# Patient Record
Sex: FEMALE | Race: WHITE | NOT HISPANIC OR LATINO | Employment: FULL TIME | URBAN - METROPOLITAN AREA
[De-identification: names, ages, dates, MRNs, and addresses within clinical notes are randomized per-mention and may not be internally consistent; named-entity substitution may affect disease eponyms.]

---

## 2017-01-04 ENCOUNTER — NON-PROVIDER VISIT (OUTPATIENT)
Dept: OCCUPATIONAL MEDICINE | Facility: CLINIC | Age: 32
End: 2017-01-04

## 2017-01-04 ENCOUNTER — APPOINTMENT (OUTPATIENT)
Dept: URGENT CARE | Facility: PHYSICIAN GROUP | Age: 32
End: 2017-01-04

## 2017-01-04 DIAGNOSIS — Z11.1 ENCOUNTER FOR PPD TEST: Primary | ICD-10-CM

## 2017-01-04 PROCEDURE — 86580 TB INTRADERMAL TEST: CPT | Performed by: PREVENTIVE MEDICINE

## 2017-01-06 ENCOUNTER — NON-PROVIDER VISIT (OUTPATIENT)
Dept: OCCUPATIONAL MEDICINE | Facility: CLINIC | Age: 32
End: 2017-01-06

## 2017-01-06 DIAGNOSIS — Z02.1 PHYSICAL EXAM, PRE-EMPLOYMENT: ICD-10-CM

## 2017-01-06 LAB — TB WHEAL 3D P 5 TU DIAM: NORMAL MM

## 2017-01-06 PROCEDURE — 99204 OFFICE O/P NEW MOD 45 MIN: CPT | Performed by: PREVENTIVE MEDICINE

## 2017-01-06 NOTE — MR AVS SNAPSHOT
Kaye Adam   2017 2:20 PM   Non-Provider Visit   MRN: [id number]    Department:  [facility]   Dept Phone:  [phone]    Description:  Female : 1985   Provider:  Charles Thornton D.O.           Allergies as of 2017     No Known Allergies      You were diagnosed with     Physical exam, pre-employment   [717826]         Vital Signs     Smoking Status                   Former Smoker           Basic Information     Date Of Birth Sex Race Ethnicity Preferred Language    1985 Female White Non- English      Health Maintenance        Date Due Completion Dates    IMM DTaP/Tdap/Td Vaccine (1 - Tdap) 10/2/2006 ---    PAP SMEAR 2014    IMM INFLUENZA (1) 2016 ---            Current Immunizations     Tuberculin Skin Test 2017  1:40 PM      Below and/or attached are the medications your provider expects you to take. Review all of your home medications and newly ordered medications with your provider and/or pharmacist. Follow medication instructions as directed by your provider and/or pharmacist. Please keep your medication list with you and share with your provider. Update the information when medications are discontinued, doses are changed, or new medications (including over-the-counter products) are added; and carry medication information at all times in the event of emergency situations     Allergies:  No Known Allergies          Medications  Valid as of: 2017 -  5:47 PM    Generic Name Brand Name Tablet Size Instructions for use    Cyclobenzaprine HCl (Tab) FLEXERIL 10 MG Take 1 Tab by mouth 3 times a day as needed.        Norgestimate-Eth Estradiol   Take  by mouth.        Ondansetron (TABLET DISPERSIBLE) ZOFRAN ODT 4 MG Take 1 Tab by mouth every 8 hours as needed for Nausea/Vomiting.        Prenatal Vit-Fe Fumarate-FA   Take  by mouth.        .                 Medicines prescribed today were sent to:     [facility]/PHARMACY #[id number] - [place]ASHIR -  [address]    [address] [place] [address]    Phone: [phone] Fax: [phone]    Open 24 Hours?: No      Medication refill instructions:       If your prescription bottle indicates you have medication refills left, it is not necessary to call your provider’s office. Please contact your pharmacy and they will refill your medication.    If your prescription bottle indicates you do not have any refills left, you may request refills at any time through one of the following ways: The online GrabInbox system (except Urgent Care), by calling your provider’s office, or by asking your pharmacy to contact your provider’s office with a refill request. Medication refills are processed only during regular business hours and may not be available until the next business day. Your provider may request additional information or to have a follow-up visit with you prior to refilling your medication.   *Please Note: Medication refills are assigned a new Rx number when refilled electronically. Your pharmacy may indicate that no refills were authorized even though a new prescription for the same medication is available at the pharmacy. Please request the medicine by name with the pharmacy before contacting your provider for a refill.           GrabInbox Access Code: [email or web address]  Expires: 1/29/2017  8:20 AM    Your email address is not on file at [facility].  Email Addresses are required for you to sign up for GrabInbox, please contact [phone] to verify your personal information and to provide your email address prior to attempting to register for GrabInbox.    Kaye Adam  [address] [place] [address]    GrabInbox  A secure, online tool to manage your health information     [facility]’s GrabInbox® is a secure, online tool that connects you to your personalized health information from the privacy of your home -- day or night - making it very easy for you to manage your healthcare. Once the  activation process is completed, you can even access your medical information using the [employer] chacho, which is available for free in the Apple Chacho store or Google Play store.     To learn more about [employer], visit [email or web address]    There are two levels of access available (as shown below):   My Chart Features  [facility]  Specialists [facility]  Urgent  Care Non-[facility] Doctor   Email your healthcare team securely and privately 24/7 X X X    Manage appointments: schedule your next appointment; view details of past/upcoming appointments X      Request prescription refills. X      View recent personal medical records, including lab and immunizations X X X X   View health record, including health history, allergies, medications X X X X   Read reports about your outpatient visits, procedures, consult and ER notes X X X X   See your discharge summary, which is a recap of your hospital and/or ER visit that includes your diagnosis, lab results, and care plan X X  X     How to register for [employer]:  Once your e-mail address has been verified, follow the following steps to sign up for [employer].     1. Go to  [email or web address]  2. Click on the Sign Up Now box, which takes you to the New Member Sign Up page. You will need to provide the following information:  a. Enter your [employer] Access Code exactly as it appears at the top of this page. (You will not need to use this code after you’ve completed the sign-up process. If you do not sign up before the expiration date, you must request a new code.)   b. Enter your date of birth.   c. Enter your home email address.   d. Click Submit, and follow the next screen’s instructions.  3. Create a Sipera Systemst ID. This will be your [employer] login ID and cannot be changed, so think of one that is secure and easy to remember.  4. Create a [employer] password. You can change your password at any time.  5. Enter your Password Reset Question and Answer.  This can be used at a later time if you forget your password.   6. Enter your e-mail address. This allows you to receive e-mail notifications when new information is available in Getourguide.  7. Click Sign Up. You can now view your health information.    For assistance activating your Getourguide account, call [phone]

## 2017-01-11 ENCOUNTER — NON-PROVIDER VISIT (OUTPATIENT)
Dept: OCCUPATIONAL MEDICINE | Facility: CLINIC | Age: 32
End: 2017-01-11

## 2017-01-11 DIAGNOSIS — Z11.1 ENCOUNTER FOR PPD TEST: ICD-10-CM

## 2017-01-11 PROCEDURE — 86580 TB INTRADERMAL TEST: CPT | Performed by: PREVENTIVE MEDICINE

## 2017-01-11 NOTE — MR AVS SNAPSHOT
Kaye Adam   2017 11:30 AM   Non-Provider Visit   MRN: [id number]    Department:  [facility]   Dept Phone:  [phone]    Description:  Female : 1985   Provider:  KATHY GORDON MA           Reason for Visit     PPD Placement           Allergies as of 2017     No Known Allergies      You were diagnosed with     Encounter for PPD test   [624490]         Vital Signs     Smoking Status                   Former Smoker           Basic Information     Date Of Birth Sex Race Ethnicity Preferred Language    1985 Female White Non- English      Health Maintenance        Date Due Completion Dates    IMM DTaP/Tdap/Td Vaccine (1 - Tdap) 10/2/2006 ---    PAP SMEAR 2014    IMM INFLUENZA (1) 2016 ---            Current Immunizations     Tuberculin Skin Test 2017, 2017  1:40 PM      Below and/or attached are the medications your provider expects you to take. Review all of your home medications and newly ordered medications with your provider and/or pharmacist. Follow medication instructions as directed by your provider and/or pharmacist. Please keep your medication list with you and share with your provider. Update the information when medications are discontinued, doses are changed, or new medications (including over-the-counter products) are added; and carry medication information at all times in the event of emergency situations     Allergies:  No Known Allergies          Medications  Valid as of: 2017 - 12:42 PM    Generic Name Brand Name Tablet Size Instructions for use    Cyclobenzaprine HCl (Tab) FLEXERIL 10 MG Take 1 Tab by mouth 3 times a day as needed.        Norgestimate-Eth Estradiol   Take  by mouth.        Ondansetron (TABLET DISPERSIBLE) ZOFRAN ODT 4 MG Take 1 Tab by mouth every 8 hours as needed for Nausea/Vomiting.        Prenatal Vit-Fe Fumarate-FA   Take  by mouth.        .                 Medicines prescribed today  were sent to:     [facility]/PHARMACY #[id number] - [place], [place] - [address]    [address] [address]    Phone: [phone] Fax: [phone]    Open 24 Hours?: No      Medication refill instructions:       If your prescription bottle indicates you have medication refills left, it is not necessary to call your provider’s office. Please contact your pharmacy and they will refill your medication.    If your prescription bottle indicates you do not have any refills left, you may request refills at any time through one of the following ways: The online Twiigg system (except Urgent Care), by calling your provider’s office, or by asking your pharmacy to contact your provider’s office with a refill request. Medication refills are processed only during regular business hours and may not be available until the next business day. Your provider may request additional information or to have a follow-up visit with you prior to refilling your medication.   *Please Note: Medication refills are assigned a new Rx number when refilled electronically. Your pharmacy may indicate that no refills were authorized even though a new prescription for the same medication is available at the pharmacy. Please request the medicine by name with the pharmacy before contacting your provider for a refill.           Twiigg Access Code: [email or web address]  Expires: 1/29/2017  8:20 AM    Your email address is not on file at [facility].  Email Addresses are required for you to sign up for Twiigg, please contact [phone] to verify your personal information and to provide your email address prior to attempting to register for Twiigg.    Kaye Adam  [address] [place] [address]    Twiigg  A secure, online tool to manage your health information     [facility]’s Twiigg® is a secure, online tool that connects you to your personalized health information from the privacy of your home -- day or night - making it very easy for  you to manage your healthcare. Once the activation process is completed, you can even access your medical information using the GoPlaceIt chacho, which is available for free in the Apple Chacho store or Google Play store.     To learn more about GoPlaceIt, visit [email or web address]    There are two levels of access available (as shown below):   My Chart Features  [facility]  Specialists [facility]  Urgent  Care Non-[facility]   Email your healthcare team securely and privately 24/7 X X X    Manage appointments: schedule your next appointment; view details of past/upcoming appointments X      Request prescription refills. X      View recent personal medical records, including lab and immunizations X X X X   View health record, including health history, allergies, medications X X X X   Read reports about your outpatient visits, procedures, consult and ER notes X X X X   See your discharge summary, which is a recap of your hospital and/or ER visit that includes your diagnosis, lab results, and care plan X X  X     How to register for GoPlaceIt:  Once your e-mail address has been verified, follow the following steps to sign up for GoPlaceIt.     1. Go to  [email or web address]  2. Click on the Sign Up Now box, which takes you to the New Member Sign Up page. You will need to provide the following information:  a. Enter your GoPlaceIt Access Code exactly as it appears at the top of this page. (You will not need to use this code after you’ve completed the sign-up process. If you do not sign up before the expiration date, you must request a new code.)   b. Enter your date of birth.   c. Enter your home email address.   d. Click Submit, and follow the next screen’s instructions.  3. Create a Design LED Productst ID. This will be your GoPlaceIt login ID and cannot be changed, so think of one that is secure and easy to remember.  4. Create a GoPlaceIt password. You can change your password at any time.  5. Enter your  Password Reset Question and Answer. This can be used at a later time if you forget your password.   6. Enter your e-mail address. This allows you to receive e-mail notifications when new information is available in LangoLab.  7. Click Sign Up. You can now view your health information.    For assistance activating your LangoLab account, call [phone]

## 2017-01-13 ENCOUNTER — NON-PROVIDER VISIT (OUTPATIENT)
Dept: OCCUPATIONAL MEDICINE | Facility: CLINIC | Age: 32
End: 2017-01-13

## 2017-01-13 LAB — TB WHEAL 3D P 5 TU DIAM: NORMAL MM

## 2017-01-30 ENCOUNTER — NON-PROVIDER VISIT (OUTPATIENT)
Dept: URGENT CARE | Facility: CLINIC | Age: 32
End: 2017-01-30

## 2017-01-30 DIAGNOSIS — Z02.1 PRE-EMPLOYMENT DRUG SCREENING: ICD-10-CM

## 2017-01-30 LAB
AMP AMPHETAMINE: NORMAL
COC COCAINE: NORMAL
INT CON NEG: NORMAL
INT CON POS: NORMAL
MET METHAMPHETAMINES: NORMAL
OPI OPIATES: NORMAL
PCP PHENCYCLIDINE: NORMAL
POC DRUG COMMENT 753798-OCCUPATIONAL HEALTH: NORMAL
THC: NORMAL

## 2017-01-30 PROCEDURE — 80305 DRUG TEST PRSMV DIR OPT OBS: CPT | Performed by: NURSE PRACTITIONER

## 2017-02-14 ENCOUNTER — APPOINTMENT (OUTPATIENT)
Dept: RADIOLOGY | Facility: MEDICAL CENTER | Age: 32
End: 2017-02-14
Attending: EMERGENCY MEDICINE

## 2017-02-14 ENCOUNTER — APPOINTMENT (OUTPATIENT)
Dept: RADIOLOGY | Facility: MEDICAL CENTER | Age: 32
End: 2017-02-14

## 2017-02-14 ENCOUNTER — HOSPITAL ENCOUNTER (EMERGENCY)
Facility: MEDICAL CENTER | Age: 32
End: 2017-02-14
Attending: EMERGENCY MEDICINE

## 2017-02-14 VITALS
TEMPERATURE: 101.5 F | DIASTOLIC BLOOD PRESSURE: 71 MMHG | HEART RATE: 104 BPM | BODY MASS INDEX: 17.64 KG/M2 | WEIGHT: 109.79 LBS | OXYGEN SATURATION: 95 % | RESPIRATION RATE: 24 BRPM | SYSTOLIC BLOOD PRESSURE: 126 MMHG | HEIGHT: 66 IN

## 2017-02-14 DIAGNOSIS — B34.9 VIRAL ILLNESS: ICD-10-CM

## 2017-02-14 LAB
ALBUMIN SERPL BCP-MCNC: 3.9 G/DL (ref 3.2–4.9)
ALBUMIN/GLOB SERPL: 1.4 G/DL
ALP SERPL-CCNC: 42 U/L (ref 30–99)
ALT SERPL-CCNC: 10 U/L (ref 2–50)
AMPHET UR QL SCN: NEGATIVE
ANION GAP SERPL CALC-SCNC: 11 MMOL/L (ref 0–11.9)
APPEARANCE UR: ABNORMAL
AST SERPL-CCNC: 13 U/L (ref 12–45)
BACTERIA #/AREA URNS HPF: ABNORMAL /HPF
BARBITURATES UR QL SCN: NEGATIVE
BASOPHILS # BLD AUTO: 0.4 % (ref 0–1.8)
BASOPHILS # BLD: 0.03 K/UL (ref 0–0.12)
BENZODIAZ UR QL SCN: NEGATIVE
BILIRUB SERPL-MCNC: 0.6 MG/DL (ref 0.1–1.5)
BILIRUB UR QL STRIP.AUTO: NEGATIVE
BUN SERPL-MCNC: 7 MG/DL (ref 8–22)
BZE UR QL SCN: NEGATIVE
CALCIUM SERPL-MCNC: 8.8 MG/DL (ref 8.5–10.5)
CANNABINOIDS UR QL SCN: NEGATIVE
CHLORIDE SERPL-SCNC: 108 MMOL/L (ref 96–112)
CO2 SERPL-SCNC: 18 MMOL/L (ref 20–33)
COLOR UR: COLORLESS
CREAT SERPL-MCNC: 0.65 MG/DL (ref 0.5–1.4)
EOSINOPHIL # BLD AUTO: 0 K/UL (ref 0–0.51)
EOSINOPHIL NFR BLD: 0 % (ref 0–6.9)
EPI CELLS #/AREA URNS HPF: ABNORMAL /HPF
ERYTHROCYTE [DISTWIDTH] IN BLOOD BY AUTOMATED COUNT: 46 FL (ref 35.9–50)
FLUAV+FLUBV AG SPEC QL IA: NORMAL
GFR SERPL CREATININE-BSD FRML MDRD: >60 ML/MIN/1.73 M 2
GLOBULIN SER CALC-MCNC: 2.7 G/DL (ref 1.9–3.5)
GLUCOSE SERPL-MCNC: 100 MG/DL (ref 65–99)
GLUCOSE UR STRIP.AUTO-MCNC: NEGATIVE MG/DL
HCG SERPL QL: NEGATIVE
HCG UR QL: NEGATIVE
HCT VFR BLD AUTO: 37 % (ref 37–47)
HGB BLD-MCNC: 12.4 G/DL (ref 12–16)
IMM GRANULOCYTES # BLD AUTO: 0.02 K/UL (ref 0–0.11)
IMM GRANULOCYTES NFR BLD AUTO: 0.3 % (ref 0–0.9)
KETONES UR STRIP.AUTO-MCNC: ABNORMAL MG/DL
LACTATE BLD-SCNC: 1.6 MMOL/L (ref 0.5–2)
LEUKOCYTE ESTERASE UR QL STRIP.AUTO: ABNORMAL
LYMPHOCYTES # BLD AUTO: 0.59 K/UL (ref 1–4.8)
LYMPHOCYTES NFR BLD: 7.9 % (ref 22–41)
MCH RBC QN AUTO: 32.8 PG (ref 27–33)
MCHC RBC AUTO-ENTMCNC: 33.5 G/DL (ref 33.6–35)
MCV RBC AUTO: 97.9 FL (ref 81.4–97.8)
MDMA UR QL SCN: NEGATIVE
METHADONE UR QL SCN: NEGATIVE
MICRO URNS: ABNORMAL
MONOCYTES # BLD AUTO: 0.52 K/UL (ref 0–0.85)
MONOCYTES NFR BLD AUTO: 7 % (ref 0–13.4)
NEUTROPHILS # BLD AUTO: 6.3 K/UL (ref 2–7.15)
NEUTROPHILS NFR BLD: 84.4 % (ref 44–72)
NITRITE UR QL STRIP.AUTO: POSITIVE
NRBC # BLD AUTO: 0 K/UL
NRBC BLD AUTO-RTO: 0 /100 WBC
OPIATES UR QL SCN: NEGATIVE
OXYCODONE UR QL SCN: NEGATIVE
PCP UR QL SCN: NEGATIVE
PH UR STRIP.AUTO: 7 [PH]
PLATELET # BLD AUTO: 156 K/UL (ref 164–446)
PMV BLD AUTO: 10.4 FL (ref 9–12.9)
POTASSIUM SERPL-SCNC: 3.5 MMOL/L (ref 3.6–5.5)
PROPOXYPH UR QL SCN: NEGATIVE
PROT SERPL-MCNC: 6.6 G/DL (ref 6–8.2)
PROT UR QL STRIP: NEGATIVE MG/DL
RBC # BLD AUTO: 3.78 M/UL (ref 4.2–5.4)
RBC # URNS HPF: ABNORMAL /HPF
RBC UR QL AUTO: ABNORMAL
SIGNIFICANT IND 70042: NORMAL
SITE SITE: NORMAL
SODIUM SERPL-SCNC: 137 MMOL/L (ref 135–145)
SOURCE SOURCE: NORMAL
SP GR UR REFRACTOMETRY: 1.01
SP GR UR STRIP.AUTO: 1.01
WBC # BLD AUTO: 7.5 K/UL (ref 4.8–10.8)
WBC #/AREA URNS HPF: >150 /HPF

## 2017-02-14 PROCEDURE — 80307 DRUG TEST PRSMV CHEM ANLYZR: CPT

## 2017-02-14 PROCEDURE — 700105 HCHG RX REV CODE 258: Performed by: EMERGENCY MEDICINE

## 2017-02-14 PROCEDURE — 81001 URINALYSIS AUTO W/SCOPE: CPT

## 2017-02-14 PROCEDURE — 84703 CHORIONIC GONADOTROPIN ASSAY: CPT

## 2017-02-14 PROCEDURE — 96361 HYDRATE IV INFUSION ADD-ON: CPT

## 2017-02-14 PROCEDURE — 96360 HYDRATION IV INFUSION INIT: CPT

## 2017-02-14 PROCEDURE — A9270 NON-COVERED ITEM OR SERVICE: HCPCS | Performed by: EMERGENCY MEDICINE

## 2017-02-14 PROCEDURE — 80053 COMPREHEN METABOLIC PANEL: CPT

## 2017-02-14 PROCEDURE — 700102 HCHG RX REV CODE 250 W/ 637 OVERRIDE(OP): Performed by: EMERGENCY MEDICINE

## 2017-02-14 PROCEDURE — 87400 INFLUENZA A/B EACH AG IA: CPT

## 2017-02-14 PROCEDURE — 87086 URINE CULTURE/COLONY COUNT: CPT

## 2017-02-14 PROCEDURE — 85025 COMPLETE CBC W/AUTO DIFF WBC: CPT

## 2017-02-14 PROCEDURE — 71010 DX-CHEST-PORTABLE (1 VIEW): CPT

## 2017-02-14 PROCEDURE — 87077 CULTURE AEROBIC IDENTIFY: CPT

## 2017-02-14 PROCEDURE — 99284 EMERGENCY DEPT VISIT MOD MDM: CPT

## 2017-02-14 PROCEDURE — 87040 BLOOD CULTURE FOR BACTERIA: CPT | Mod: 91

## 2017-02-14 PROCEDURE — 83605 ASSAY OF LACTIC ACID: CPT

## 2017-02-14 PROCEDURE — 81025 URINE PREGNANCY TEST: CPT

## 2017-02-14 PROCEDURE — 87186 SC STD MICRODIL/AGAR DIL: CPT

## 2017-02-14 RX ORDER — ACETAMINOPHEN 325 MG/1
650 TABLET ORAL ONCE
Status: COMPLETED | OUTPATIENT
Start: 2017-02-14 | End: 2017-02-14

## 2017-02-14 RX ORDER — SODIUM CHLORIDE 9 MG/ML
2000 INJECTION, SOLUTION INTRAVENOUS ONCE
Status: COMPLETED | OUTPATIENT
Start: 2017-02-14 | End: 2017-02-14

## 2017-02-14 RX ORDER — IBUPROFEN 800 MG/1
600 TABLET ORAL EVERY 8 HOURS PRN
Qty: 30 TAB | Refills: 0 | Status: SHIPPED | OUTPATIENT
Start: 2017-02-14

## 2017-02-14 RX ORDER — NITROFURANTOIN 25; 75 MG/1; MG/1
100 CAPSULE ORAL ONCE
Status: COMPLETED | OUTPATIENT
Start: 2017-02-14 | End: 2017-02-14

## 2017-02-14 RX ORDER — IBUPROFEN 600 MG/1
600 TABLET ORAL ONCE
Status: COMPLETED | OUTPATIENT
Start: 2017-02-14 | End: 2017-02-14

## 2017-02-14 RX ORDER — ACETAMINOPHEN 500 MG
500 TABLET ORAL EVERY 6 HOURS PRN
Qty: 30 TAB | Refills: 0 | Status: SHIPPED | OUTPATIENT
Start: 2017-02-14

## 2017-02-14 RX ADMIN — SODIUM CHLORIDE 2000 ML: 9 INJECTION, SOLUTION INTRAVENOUS at 20:51

## 2017-02-14 RX ADMIN — NITROFURANTOIN MONOHYDRATE AND NITROFURANTOIN MACROCRYSTALLINE 100 MG: 75; 25 CAPSULE ORAL at 21:39

## 2017-02-14 RX ADMIN — ACETAMINOPHEN 650 MG: 325 TABLET, FILM COATED ORAL at 21:39

## 2017-02-14 RX ADMIN — IBUPROFEN 600 MG: 600 TABLET, FILM COATED ORAL at 20:50

## 2017-02-14 ASSESSMENT — PAIN SCALES - GENERAL: PAINLEVEL_OUTOF10: 10

## 2017-02-14 NOTE — ED AVS SNAPSHOT
2/14/2017          Kaye Adam  [address]  [place] [address]    Dear Kaye:    [facility] wants to ensure your discharge home is safe and you or your loved ones have had all your questions answered regarding your care after you leave the hospital.    You may receive a telephone call within two days of your discharge.  This call is to make certain you understand your discharge instructions as well as ensure we provided you with the best care possible during your stay with us.     The call will only last approximately 3-5 minutes and will be done by a nurse.    Once again, we want to ensure your discharge home is safe and that you have a clear understanding of any next steps in your care.  If you have any questions or concerns, please do not hesitate to contact us, we are here for you.  Thank you for choosing [facility] for your healthcare needs.    Sincerely,    Yinka Davis    [facility]

## 2017-02-14 NOTE — ED AVS SNAPSHOT
[facility] Access Code: [email or web address]  Expires: 2/27/2017  9:48 AM    Your email address is not on file at [facility].  Email Addresses are required for you to sign up for [facility], please contact [phone] to verify your personal information and to provide your email address prior to attempting to register for [facility].    Kaye Adam  [address] [place] [address], [place] [address]    [facility]  A secure, online tool to manage your health information     [facility]’s [facility]® is a secure, online tool that connects you to your personalized health information from the privacy of your home -- day or night - making it very easy for you to manage your healthcare. Once the activation process is completed, you can even access your medical information using the [facility] chacho, which is available for free in the Apple Chacho store or Google Play store.     To learn more about [facility], visit [email or web address]    There are two levels of access available (as shown below):   My Chart Features  [facility] Primary Care Doctor [facility]  Specialists [facility]  Urgent  Care Non-[facility] Primary Care Doctor   Email your healthcare team securely and privately 24/7 X X X    Manage appointments: schedule your next appointment; view details of past/upcoming appointments X      Request prescription refills. X      View recent personal medical records, including lab and immunizations X X X X   View health record, including health history, allergies, medications X X X X   Read reports about your outpatient visits, procedures, consult and ER notes X X X X   See your discharge summary, which is a recap of your hospital and/or ER visit that includes your diagnosis, lab results, and care plan X X  X     How to register for YouHelpt:  Once your e-mail address has been verified, follow the following steps to sign up for YouHelpt.     1. Go to  [email or web address]  2. Click on the Sign Up Now box, which takes you to the New Member Sign Up page. You  will need to provide the following information:  a. Enter your WorkFlex Solutions Access Code exactly as it appears at the top of this page. (You will not need to use this code after you’ve completed the sign-up process. If you do not sign up before the expiration date, you must request a new code.)   b. Enter your date of birth.   c. Enter your home email address.   d. Click Submit, and follow the next screen’s instructions.  3. Create a Taskmitt ID. This will be your WorkFlex Solutions login ID and cannot be changed, so think of one that is secure and easy to remember.  4. Create a Work[employer] Solutions password. You can change your password at any time.  5. Enter your Password Reset Question and Answer. This can be used at a later time if you forget your password.   6. Enter your e-mail address. This allows you to receive e-mail notifications when new information is available in WorkFlex Solutions.  7. Click Sign Up. You can now view your health information.    For assistance activating your WorkFlex Solutions account, call [phone]

## 2017-02-14 NOTE — LETTER
2/16/2017               Kaye Adam  [address]  [place] [address]        Dear Kaye (MR#[id number])    As we have been unable to contact you by phone, this letter is sent in regards to your recent visit to the [facility] Emergency Department on 2/14/2017.  During the visit, tests were performed to assist the physician in a medical diagnosis.  A review of those tests requires that we notify you of the following:    Your urine culture and sensitivity that was POSITIVE for a bacteria called Escherichia coli, and further treatment may be necessary. IF YOU ARE NOT FEELING BETTER PLEASE CONTACT ME AS SOON AS POSSIBLE AT THE NUMBER BELOW.       Because of the above findings, we advise that you see your private physician or you may return to the Emergency Department for additional treatment.      Please feel free to contact me at the number below if you have any questions or concerns. Thank you for your cooperation in the matter.    Sincerely,  ED Culture Follow-Up Staff  Zee James, PharmD    [facility], Emergency Department  [address] [place] [address]  [phone] (ED Culture Line)  [phone]

## 2017-02-14 NOTE — ED AVS SNAPSHOT
Home Care Instructions                                                                                                                Kaye Adam   MRN: [id number]    Department:  [facility], Emergency Dept   Date of Visit:  2/14/2017            [facility], Emergency Dept    [address] [facility] [address]    Phone:  [phone]      You were seen by     Saurav Street M.D.      Your Diagnosis Was     Viral illness     B34.9       These are the medications you received during your hospitalization from 02/14/2017 1859 to 02/14/2017 2224     Date/Time Order Dose Route Action    02/14/2017 2050 ibuprofen (MOTRIN) tablet 600 mg 600 mg Oral Given    02/14/2017 2051 NS infusion 2,000 mL 2,000 mL Intravenous New Bag    02/14/2017 2139 acetaminophen (TYLENOL) tablet 650 mg 650 mg Oral Given    02/14/2017 2139 nitrofurantoin monohydr macro (MACROBID) capsule CAPS 100 mg 100 mg Oral Given      Follow-up Information     1. Follow up with [facility].    Contact information    [address] [address]  [phone]        2. Follow up with [facility].    Contact information    [address]  [facility] [address] [phone]          3. Follow up with [facility].    Contact information    [address]  [facility]  [phone]        Medication Information     Review all of your home medications and newly ordered medications with your primary doctor and/or pharmacist as soon as possible. Follow medication instructions as directed by your doctor and/or pharmacist.     Please keep your complete medication list with you and share with your physician. Update the information when medications are discontinued, doses are changed, or new medications (including over-the-counter products) are added; and carry medication information at all times in the event of emergency situations.               Medication List      START taking these  "medications        Instructions    acetaminophen 500 MG Tabs   Commonly known as:  TYLENOL    Take 1 Tab by mouth every 6 hours as needed.   Dose:  500 mg       ibuprofen 800 MG Tabs   Commonly known as:  MOTRIN    Take 1 Tab by mouth every 8 hours as needed.   Dose:  600 mg         ASK your doctor about these medications        Instructions    cyclobenzaprine 10 MG Tabs   Commonly known as:  FLEXERIL    Take 1 Tab by mouth 3 times a day as needed.   Dose:  10 mg       ondansetron 4 MG Tbdp   Commonly known as:  ZOFRAN ODT    Take 1 Tab by mouth every 8 hours as needed for Nausea/Vomiting.   Dose:  4 mg       PRENATAL FORMULA PO    Take  by mouth.       SPRINTEC 28 PO    Take  by mouth.               Procedures and tests performed during your visit     Procedure/Test Number of Times Performed    BETA-HCG QUALITATIVE SERUM 1    BETA-HCG QUALITATIVE URINE 1    BLOOD CULTURE 2    CARDIAC MONITORING 1    CBC WITH DIFFERENTIAL 1    COMP METABOLIC PANEL 1    DX-CHEST-PORTABLE (1 VIEW) 1    ESTIMATED GFR 1    INFLUENZA RAPID 1    IV Saline Lock 1    Lactic acid (lactate) 1    OXYGEN THERAPY PER PROTOCOL 1    REFRACTOMETER SG 1    URINALYSIS 1    URINE CULTURE(NEW) 1    URINE DRUG SCREEN 1    URINE MICROSCOPIC (W/UA) 1        Discharge Instructions       Your tests here were unremarkable. There is no sign of any bacterial infection. The most likely cause of your symptoms is a viral illness. The immune system is built to clear this type of infection. Antibiotics will not change the course of this type of infection. Alternate between tylenol and motrin as needed for aches and pains and fevers.   Viral illnesses can last 7-14 days and occasionally longer. Close observation of the patient, and returning to a doctor for severe symptoms remain important.      Viral Syndrome  You or your child has Viral Syndrome. It is the most common infection causing \"colds\" and infections in the nose, throat, sinuses, and breathing tubes. " Sometimes the infection causes nausea, vomiting, or diarrhea. The germ that causes the infection is a virus. No antibiotic or other medicine will kill it. There are medicines that you can take to make you or your child more comfortable.   HOME CARE INSTRUCTIONS   · Rest in bed until you start to feel better.   · If you have diarrhea or vomiting, eat small amounts of crackers and toast. Soup is helpful.   · Do not give aspirin or medicine that contains aspirin to children.   · Only take over-the-counter or prescription medicines for pain, discomfort, or fever as directed by your caregiver.   SEEK IMMEDIATE MEDICAL CARE IF:   · You or your child has not improved within one week.   · You or your child has pain that is not at least partially relieved by over-the-counter medicine.   · Thick, colored mucus or blood is coughed up.   · Discharge from the nose becomes thick yellow or green.   · Diarrhea or vomiting gets worse.   · There is any major change in your or your child's condition.   · You or your child develops a skin rash, stiff neck, severe headache, or are unable to hold down food or fluid.   · You or your child has an oral temperature above 102° F (38.9° C), not controlled by medicine.   · Your baby is older than 3 months with a rectal temperature of 102° F (38.9° C) or higher.   · Your baby is 3 months old or younger with a rectal temperature of 100.4° F (38° C) or higher.   Document Released: 12/03/2007 Document Revised: 03/11/2013 Document Reviewed: 12/03/2008  [employer]® Patient Information ©2013 [employer].          Patient Information     Patient Information    Following emergency treatment: all patient requiring follow-up care must return either to a private physician or a clinic if your condition worsens before you are able to obtain further medical attention, please return to the emergency room.     Billing Information    At [facility], we work to make the billing process streamlined for our  patients.  Our Representatives are here to answer any questions you may have regarding your hospital bill.  If you have insurance coverage and have supplied your insurance information to us, we will submit a claim to your insurer on your behalf.  Should you have any questions regarding your bill, we can be reached online or by phone as follows:  Online: You are able pay your bills online or live chat with our representatives about any billing questions you may have. We are here to help Monday - Friday from 8:00am to 7:30pm and 9:00am - 12:00pm on Saturdays.  Please visit [email or web address]  for more information.   Phone:  [phone] or [phone]    Please note that your emergency physician, surgeon, pathologist, radiologist, anesthesiologist, and other specialists are not employed by [facility] and will therefore bill separately for their services.  Please contact them directly for any questions concerning their bills at the numbers below:     Emergency Physician Services:  [phone]  [facility]:  [phone]  [facility]:  [phone]  [facility]:  [phone]    1. Your final bill may vary from the amount quoted upon discharge if all procedures are not complete at that time, or if your doctor has additional procedures of which we are not aware. You will receive an additional bill if you return to the Emergency Department at [facility] hospital for suture removal regardless of the facility of which the sutures were placed.     2. Please arrange for settlement of this account at the emergency registration.    3. All self-pay accounts are due in full at the time of treatment.  If you are unable to meet this obligation then payment is expected within 4-5 days.     4. If you have had radiology studies (CT, X-ray, Ultrasound, MRI), you have received a preliminary result during your emergency department visit. Please contact the  radiology department [phone] to receive a copy of your final result. Please discuss the Final result with your primary physician or with the follow up physician provided.     Crisis Hotline:  [place] Crisis Hotline:  9-898-GGRBWIF or 1-492.752.8845  [place] Crisis Hotline:    1-424.667.5818 or 065-425-2998         ED Discharge Follow Up Questions    1. In order to provide you with very good care, we would like to follow up with a phone call in the next few days.  May we have your permission to contact you?     YES /  NO    2. What is the best phone number to call you? (       )_____-__________    3. What is the best time to call you?      Morning  /  Afternoon  /  Evening                   Patient Signature:  ____________________________________________________________    Date:  ____________________________________________________________

## 2017-02-15 NOTE — ED NOTES
Pt updated on discharge orders. IVF still infusing. Pt calling  back to hospital for ride home. Will DC when IVF done infusing.

## 2017-02-15 NOTE — ED NOTES
Received call from , specific gravity 1.006, which can cause a false negative in pregnancy, suggested a serum qual/quant be drawn, discussed with Dr. Street, will draw additional lab.

## 2017-02-15 NOTE — ED PROVIDER NOTES
ED Provider Note    Scribed for Saurav Street M.D. by Mali Hector. 2/14/2017,  8:31 PM.    CHIEF COMPLAINT  Chief Complaint   Patient presents with   • Flu Like Symptoms     fever, generalised body aches. cough. x 2 days     HPI  Kaye Adam is a 31 y.o. female who presents to the Emergency Department with persistent flu-like symptoms onset two days ago.  She denies sick contacts.  The patient's main complaints are fevers and generalized body aches.  She has also developed mild cough and associated abdominal pain.  The patient does not have congestion, rhinorrhea, or sputum production.  She found minimal relief from over the counter medications and has not taken any doses in nine hours.  Currently, the patient has continued muscle soreness throughout her body.  She denies cough and breath shortness. The patient has not been suffering from recent dysuria or hematuria.  She does not note nausea or vomiting.  The patient is otherwise healthy without chronic medical conditions.  Her menstrual cycle ended one day ago. She did not receive a flu vaccination this year.  The patient denies additional symptoms or further pertinent medical history.     REVIEW OF SYSTEMS  See HPI for further details. All other systems are negative. C.     PAST MEDICAL HISTORY   has a past medical history of Backpain (pregnancy induced ) and Urinary tract infection, site not specified.    SOCIAL HISTORY  Social History     Social History Main Topics   • Smoking status: Former Smoker     Types: Cigarettes   • Smokeless tobacco:       Comment: QUIT 7 MONTHS AGO, USED TO SMOKE SOCIALLY.    • Alcohol Use: No      Comment: SPECICAL OCCATIONS   • Drug Use: No   • Sexual Activity:     Partners: Male     Birth Control/ Protection: Pill     History   Drug Use No     SURGICAL HISTORY  patient denies any surgical history    CURRENT MEDICATIONS  Home Medications     Reviewed by Delores Bravo R.N. (Registered Nurse) on 02/14/17  "at 2029  Med List Status: Complete    Medication Last Dose Status    cyclobenzaprine (FLEXERIL) 10 MG Tab not taking Active    Norgestimate-Eth Estradiol (SPRINTEC 28 PO) 2/14/2017 Active    ondansetron (ZOFRAN ODT) 4 MG TABLET DISPERSIBLE not taking Active    Prenatal Multivit-Min-Fe-FA (PRENATAL FORMULA PO) Not Taking Active              ALLERGIES  No Known Allergies    PHYSICAL EXAM  VITAL SIGNS: /71 mmHg  Pulse 118  Temp(Src) 38.6 °C (101.5 °F)  Resp 20  Ht 1.676 m (5' 6\")  Wt 49.8 kg (109 lb 12.6 oz)  BMI 17.73 kg/m2  SpO2 97%  Pulse ox interpretation: I interpret this pulse ox as normal.  Constitutional: Alert, Cooperative, Tearful  HENT: No signs of trauma, Bilateral external ears normal, Nose normal. Moist mucous membranes.   Eyes: Pupils are equal and reactive, Conjunctiva normal, Non-icteric.   Neck: Normal range of motion, No tenderness, Supple, No stridor.   Lymphatic: No lymphadenopathy noted.   Cardiovascular: Tachycardic. Regular rhythm, no murmurs.   Thorax & Lungs: Normal breath sounds, No respiratory distress, No wheezing, No chest tenderness.   Abdomen: Bowel sounds normal, Soft, No tenderness, No masses, No pulsatile masses. No peritoneal signs.  Skin: Warm, Dry, No erythema, No rash.   Back: No midline bony tenderness.   Extremities: Intact distal pulses, No edema, No tenderness, No cyanosis.  Musculoskeletal: Good range of motion in all major joints. No tenderness to palpation or major deformities noted.   Neurologic: Alert , Normal motor function, Normal sensory function, No focal deficits noted.   Psychiatric: Anxious, Tearful, Dramatic.     DIAGNOSTIC STUDIES / PROCEDURES    LABS  Labs Reviewed   CBC WITH DIFFERENTIAL - Abnormal; Notable for the following:     RBC 3.78 (*)     MCV 97.9 (*)     MCHC 33.5 (*)     Platelet Count 156 (*)     Neutrophils-Polys 84.40 (*)     Lymphocytes 7.90 (*)     Lymphs (Absolute) 0.59 (*)     All other components within normal limits   COMP " "METABOLIC PANEL - Abnormal; Notable for the following:     Potassium 3.5 (*)     Co2 18 (*)     Glucose 100 (*)     Bun 7 (*)     All other components within normal limits   URINALYSIS - Abnormal; Notable for the following:     Character Sl Cloudy (*)     Ketones Trace (*)     Nitrite Positive (*)     Leukocyte Esterase Large (*)     Occult Blood Small (*)     All other components within normal limits    Narrative:     Indication for culture:->Emergency Room Patient   URINE CULTURE(NEW) - Abnormal; Notable for the following:     Significant Indicator POS (*)     Urine Culture   (*)     Urine Culture   (*)     Value: Escherichia coli  >100,000 cfu/mL      All other components within normal limits    Narrative:     Indication for culture:->Emergency Room Patient   URINE MICROSCOPIC (W/UA) - Abnormal; Notable for the following:     WBC >150 (*)     RBC 2-5 (*)     Bacteria Few (*)     Epithelial Cells Many (*)     All other components within normal limits    Narrative:     Indication for culture:->Emergency Room Patient   LACTIC ACID   BLOOD CULTURE    Narrative:     Per Hospital Policy: Only change Specimen Src: to \"Line\" if  specified by physician order.   BLOOD CULTURE    Narrative:     Per Hospital Policy: Only change Specimen Src: to \"Line\" if  specified by physician order.   URINE DRUG SCREEN   HCG QUALITATIVE UR   REFRACTOMETER SG   ESTIMATED GFR   INFLUENZA RAPID   HCG QUAL SERUM     All labs reviewed by me.    RADIOLOGY  DX-CHEST-PORTABLE (1 VIEW)   Final Result         1. No acute cardiopulmonary abnormalities are identified.        The radiologist's interpretation of all radiological studies have been reviewed by me.    COURSE & MEDICAL DECISION MAKING  Nursing notes, VS, PMSFHx reviewed in chart.     Review of past medical records shows the patient has not been seen at the ED since 2009.      8:31 PM Patient seen and examined at bedside.  The patient was informed that her chest x-ray, ordered from triage, " was negative and she therefore does not present with pneumonia.  Ordered for UA, Urine Drug Screen, Beta-HCG Qualitative Urine, CBC, CMP,  Blood Culture, and Refractometer to evaluate. Patient will be treated with Tylenol, 650 mg tablet, Motrin, 600 mg tablet and an NS Infusion 2,000 ml for her symptoms.     9:06 PM- I was informed by the patient's attending nurse that the patient had a specific gravity of 1.006, which can cause a false negative pregnancy test. The patient will complete a Beta-HCG Qualitative Serum.     9:22 PM- The patient's ua looks like a contaminated sample, and she did not have urinary tract symptoms.     9:45 PM- At this time the patient's ED work up is complete.  I extensively reviewed the lab results, images, and radiologist's interpretations, see above.  The patient will be updated shortly.     9:50 PM- Re-examined; The patient is resting in bed with improved comfort. I discussed her above findings and plans for discharge with prescriptions for Tylenol and Motrin for a likely viral syndrome.  She understands that she likely presents with a viral illness for which antibiotics are not indicated. WE discussed supportive care measures. The patient was encouraged to take Ibuprofen and Motrin as needed for fever and pain. Patient understands that she can alternate these two medications as fever persists.  She was instructed to return to the ED if her symptoms worsen.  Patient understands that symptoms can persist for one to a few weeks. Patient was referred to [facility] and the [facility]. The patient will receive further information  to take home.  All questions and concerns were addressed.   Patient understands and agrees.     The patient will return for new or worsening symptoms and is stable at the time of discharge.  The patient is referred to a primary physician for blood pressure management, diabetic screening, and for all other preventative health  concerns.    DISPOSITION:  Patient will be discharged home in stable condition.    FOLLOW UP:  [address] [address]  [phone]        [facility]  [address]  [facility] [address]  [phone]          [facility]  [address]  [facility]  [phone]          OUTPATIENT MEDICATIONS:  Discharge Medication List as of 2/14/2017 10:24 PM      START taking these medications    Details   ibuprofen (MOTRIN) 800 MG Tab Take 1 Tab by mouth every 8 hours as needed., Disp-30 Tab, R-0, Print Rx Paper      acetaminophen (TYLENOL) 500 MG Tab Take 1 Tab by mouth every 6 hours as needed., Disp-30 Tab, R-0, Print Rx Paper           FINAL IMPRESSION  1. Viral illness       Mali GUEVARA (Scribe), am scribing for, and in the presence of, Saurav Street M.D..    Electronically signed by: Mali Hector (Tawandaibe), 2/14/2017    ISaurav M.D. personally performed the services described in this documentation, as scribed by Mali Hector in my presence, and it is both accurate and complete.    The note accurately reflects work and decisions made by me.  Saurav Street  2/17/2017  9:06 AM

## 2017-02-15 NOTE — ED NOTES
Chief Complaint   Patient presents with   • Flu Like Symptoms     fever, generalised body aches. cough. x 2 days     Pt roomed at placed on monitors. Denies CP/SOB. CXR preformed bedside. Chart up for ERP.

## 2017-02-15 NOTE — ED NOTES
Chief Complaint   Patient presents with   • Flu Like Symptoms     fever, generalised body aches. cough. x 2 days   Pt in W/C to triage with above complaint. Pt returned to lobby, educated on triage process, and to inform staff of any changes or concerns.   Charge aware of patient. Protocols ordered.

## 2017-02-15 NOTE — DISCHARGE INSTRUCTIONS
"Your tests here were unremarkable. There is no sign of any bacterial infection. The most likely cause of your symptoms is a viral illness. The immune system is built to clear this type of infection. Antibiotics will not change the course of this type of infection. Alternate between tylenol and motrin as needed for aches and pains and fevers.   Viral illnesses can last 7-14 days and occasionally longer. Close observation of the patient, and returning to a doctor for severe symptoms remain important.      Viral Syndrome  You or your child has Viral Syndrome. It is the most common infection causing \"colds\" and infections in the nose, throat, sinuses, and breathing tubes. Sometimes the infection causes nausea, vomiting, or diarrhea. The germ that causes the infection is a virus. No antibiotic or other medicine will kill it. There are medicines that you can take to make you or your child more comfortable.   HOME CARE INSTRUCTIONS   · Rest in bed until you start to feel better.   · If you have diarrhea or vomiting, eat small amounts of crackers and toast. Soup is helpful.   · Do not give aspirin or medicine that contains aspirin to children.   · Only take over-the-counter or prescription medicines for pain, discomfort, or fever as directed by your caregiver.   SEEK IMMEDIATE MEDICAL CARE IF:   · You or your child has not improved within one week.   · You or your child has pain that is not at least partially relieved by over-the-counter medicine.   · Thick, colored mucus or blood is coughed up.   · Discharge from the nose becomes thick yellow or green.   · Diarrhea or vomiting gets worse.   · There is any major change in your or your child's condition.   · You or your child develops a skin rash, stiff neck, severe headache, or are unable to hold down food or fluid.   · You or your child has an oral temperature above 102° F (38.9° C), not controlled by medicine.   · Your baby is older than 3 months with a rectal temperature " of 102° F (38.9° C) or higher.   · Your baby is 3 months old or younger with a rectal temperature of 100.4° F (38° C) or higher.   Document Released: 12/03/2007 Document Revised: 03/11/2013 Document Reviewed: 12/03/2008  [employer]® Patient Information ©2013 [employer].

## 2017-02-16 LAB
BACTERIA UR CULT: ABNORMAL
BACTERIA UR CULT: ABNORMAL
SIGNIFICANT IND 70042: ABNORMAL
SITE SITE: ABNORMAL
SOURCE SOURCE: ABNORMAL

## 2017-02-16 NOTE — ED NOTES
ED Positive Culture Follow-up/Notification Note:    Date: 2/15/17     Patient seen in the ED on 2/14/2017 for fever, generalized body aches and cough x 2 days.   1. Viral illness       Discharge Medication List as of 2/14/2017 10:24 PM      START taking these medications    Details   ibuprofen (MOTRIN) 800 MG Tab Take 1 Tab by mouth every 8 hours as needed., Disp-30 Tab, R-0, Print Rx Paper      acetaminophen (TYLENOL) 500 MG Tab Take 1 Tab by mouth every 6 hours as needed., Disp-30 Tab, R-0, Print Rx Paper             Allergies: Review of patient's allergies indicates no known allergies.     Final cultures:   Results     Procedure Component Value Units Date/Time    URINE CULTURE(NEW) [[id number]]  (Abnormal)  (Susceptibility) Collected:  02/14/17 2045    Order Status:  Completed Specimen Information:  Urine Updated:  02/16/17 0826     Significant Indicator POS (POS)      Source UR      Site --      Urine Culture -- (A)      Urine Culture -- (A)      Result:        Escherichia coli  >100,000 cfu/mL      Narrative:      Indication for culture:->Emergency Room Patient    Culture & Susceptibility     ESCHERICHIA COLI     Antibiotic Sensitivity Microscan Unit Status    Ampicillin Sensitive <=8 mcg/mL Final    Cefepime Sensitive <=8 mcg/mL Final    Cefotaxime Sensitive <=2 mcg/mL Final    Cefotetan Sensitive <=16 mcg/mL Final    Ceftazidime Sensitive <=1 mcg/mL Final    Ceftriaxone Sensitive <=8 mcg/mL Final    Cefuroxime Sensitive <=4 mcg/mL Final    Cephalothin Sensitive <=8 mcg/mL Final    Ciprofloxacin Sensitive <=1 mcg/mL Final    Gentamicin Sensitive <=4 mcg/mL Final    Levofloxacin Sensitive <=2 mcg/mL Final    Nitrofurantoin Sensitive <=32 mcg/mL Final    Pip/Tazobactam Sensitive <=16 mcg/mL Final    Piperacillin Sensitive <=16 mcg/mL Final    Tigecycline Sensitive <=2 mcg/mL Final    Tobramycin Sensitive <=4 mcg/mL Final    Trimeth/Sulfa Sensitive <=2/38 mcg/mL Final                       BLOOD CULTURE  "[[id number]] Collected:  02/14/17 2039    Order Status:  Completed Specimen Information:  Blood from Peripheral Updated:  02/15/17 0840     Significant Indicator NEG      Source BLD      Site PERIPHERAL      Blood Culture --      Result:        No Growth    Note: Blood cultures are incubated for 5 days and  are monitored continuously.Positive blood cultures  are called to the RN and reported as soon as  they are identified.      Narrative:      Per Hospital Policy: Only change Specimen Src: to \"Line\" if  specified by physician order.    BLOOD CULTURE [[id number]] Collected:  02/14/17 1958    Order Status:  Completed Specimen Information:  Blood from Peripheral Updated:  02/15/17 0840     Significant Indicator NEG      Source BLD      Site PERIPHERAL      Blood Culture --      Result:        No Growth    Note: Blood cultures are incubated for 5 days and  are monitored continuously.Positive blood cultures  are called to the RN and reported as soon as  they are identified.      Narrative:      Per Hospital Policy: Only change Specimen Src: to \"Line\" if  specified by physician order.    INFLUENZA RAPID [[id number]] Collected:  02/14/17 2046    Order Status:  Completed Specimen Information:  Respirate from Respiratory Updated:  02/14/17 2138     Significant Indicator NEG      Source RESP      Site Nasopharyngeal      Rapid Influenza A-B --      Result:        Negative for Influenza A and Influenza B antigens.  Infection due to influenza A or B cannot be ruled out  since the antigen present in the specimen may be below the  detection limit of the test. Culture confirmation of  negative samples is recommended.      URINALYSIS [[id number]]  (Abnormal) Collected:  02/14/17 2045    Order Status:  Completed Specimen Information:  Urine Updated:  02/14/17 2119     Micro Urine Req Microscopic      Color Colorless      Character Sl Cloudy (A)      Specific Gravity 1.008      Ph 7.0      Glucose Negative mg/dL      Ketones Trace (A) " mg/dL      Protein Negative mg/dL      Bilirubin Negative      Nitrite Positive (A)      Leukocyte Esterase Large (A)      Occult Blood Small (A)     Narrative:      Indication for culture:->Emergency Room Patient          Plan:   Patient doesn't appear to be treated for urinary tract infection at this time.  Unclear if urinary symptoms present at time of visit.  Attempted to contact the patient but VM box was full. Will send a letter to the patient to notify of result and encourage f/u if she is not feeling better.    Zee James

## 2017-02-19 LAB
BACTERIA BLD CULT: NORMAL
BACTERIA BLD CULT: NORMAL
SIGNIFICANT IND 70042: NORMAL
SIGNIFICANT IND 70042: NORMAL
SITE SITE: NORMAL
SITE SITE: NORMAL
SOURCE SOURCE: NORMAL
SOURCE SOURCE: NORMAL